# Patient Record
Sex: MALE | Race: WHITE | NOT HISPANIC OR LATINO | Employment: UNEMPLOYED | URBAN - NONMETROPOLITAN AREA
[De-identification: names, ages, dates, MRNs, and addresses within clinical notes are randomized per-mention and may not be internally consistent; named-entity substitution may affect disease eponyms.]

---
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
## 2021-02-23 ENCOUNTER — APPOINTMENT (OUTPATIENT)
Dept: ULTRASOUND IMAGING | Facility: HOSPITAL | Age: 21
End: 2021-02-23
Attending: NURSE PRACTITIONER
Payer: COMMERCIAL

## 2021-02-23 ENCOUNTER — HOSPITAL ENCOUNTER (EMERGENCY)
Facility: HOSPITAL | Age: 21
Discharge: HOME | End: 2021-02-23
Attending: NURSE PRACTITIONER | Admitting: NURSE PRACTITIONER
Payer: COMMERCIAL

## 2021-02-23 VITALS
RESPIRATION RATE: 14 BRPM | DIASTOLIC BLOOD PRESSURE: 87 MMHG | SYSTOLIC BLOOD PRESSURE: 138 MMHG | TEMPERATURE: 98.3 F | OXYGEN SATURATION: 97 % | HEART RATE: 87 BPM

## 2021-02-23 DIAGNOSIS — I86.1 LEFT VARICOCELE: ICD-10-CM

## 2021-02-23 DIAGNOSIS — N50.9 DISORDER OF MALE GENITAL ORGANS: ICD-10-CM

## 2021-02-23 LAB
ALBUMIN UR-MCNC: NEGATIVE MG/DL
APPEARANCE UR: CLEAR
BACTERIA #/AREA URNS HPF: ABNORMAL /HPF
BILIRUB UR QL STRIP: NEGATIVE
C TRACH DNA SPEC QL NAA+PROBE: NOT DETECTED
COLOR UR AUTO: ABNORMAL
GLUCOSE UR STRIP-MCNC: NEGATIVE MG/DL
HGB UR QL STRIP: NEGATIVE
KETONES UR STRIP-MCNC: NEGATIVE MG/DL
LEUKOCYTE ESTERASE UR QL STRIP: NEGATIVE
MUCOUS THREADS #/AREA URNS LPF: PRESENT /LPF
N GONORRHOEA DNA SPEC QL NAA+PROBE: NOT DETECTED
NITRATE UR QL: NEGATIVE
PH UR STRIP: 8 PH (ref 4.7–8)
RBC #/AREA URNS AUTO: 0 /HPF (ref 0–2)
SOURCE: ABNORMAL
SP GR UR STRIP: 1.02 (ref 1–1.03)
SPECIMEN SOURCE: NORMAL
SQUAMOUS #/AREA URNS AUTO: 0 /HPF (ref 0–1)
UROBILINOGEN UR STRIP-MCNC: NORMAL MG/DL (ref 0–2)
WBC #/AREA URNS AUTO: <1 /HPF (ref 0–5)

## 2021-02-23 PROCEDURE — G0463 HOSPITAL OUTPT CLINIC VISIT: HCPCS | Mod: 25

## 2021-02-23 PROCEDURE — 87591 N.GONORRHOEAE DNA AMP PROB: CPT | Performed by: NURSE PRACTITIONER

## 2021-02-23 PROCEDURE — 81001 URINALYSIS AUTO W/SCOPE: CPT | Performed by: NURSE PRACTITIONER

## 2021-02-23 PROCEDURE — 76870 US EXAM SCROTUM: CPT

## 2021-02-23 PROCEDURE — 87491 CHLMYD TRACH DNA AMP PROBE: CPT | Performed by: NURSE PRACTITIONER

## 2021-02-23 PROCEDURE — 99214 OFFICE O/P EST MOD 30 MIN: CPT | Performed by: NURSE PRACTITIONER

## 2021-02-23 ASSESSMENT — ENCOUNTER SYMPTOMS
DYSURIA: 0
VOMITING: 0
DIZZINESS: 0
NAUSEA: 0
HEADACHES: 0
DIARRHEA: 0
ACTIVITY CHANGE: 1
FEVER: 0
LIGHT-HEADEDNESS: 0
BACK PAIN: 0
SHORTNESS OF BREATH: 0
APPETITE CHANGE: 0
FREQUENCY: 1
FLANK PAIN: 0
CHILLS: 0

## 2021-02-23 NOTE — ED PROVIDER NOTES
History     Chief Complaint   Patient presents with     Exposure to STD     requesting std testing due to concerned about possible exposure     HPI  Osvaldo Hadley is a 20 year old male who had  unprotected sex three weeks ago and is now concerned about contracted STI's. His symptoms, however, have been ongoing before this encounter. It is painful for him to urinate after he ejaculates. After he has completed micturition, it still feels like he has to void. This has been ongoing for some time. A warm shower eventually helps the discomfort to subside. smokes cannabis. Denies genital sores, scrotal swelling, testicular pain, and abnormal sperm color.  Denies fevers, chills, nausea, vomiting, diarrhea, and shortness of breath.    Genitourinary symptoms      Duration: three weeks    Description:  Ejaculate and then voids and then has pain. Feels like he still has to void after completion    Intensity:  Mild to moderate    Accompanying signs and symptoms (fever/discharge/nausea/vomiting/back or abdominal pain):  None    History (frequent UTI's/kidney stones/prostate problems): None  Sexually active: YES    Precipitating or alleviating factors: unprotected sex    Therapies tried and outcome: hot shower helps   Outcome: pain subsides.     Allergies:  Allergies   Allergen Reactions     Cats        Problem List:    Patient Active Problem List    Diagnosis Date Noted     NO ACTIVE PROBLEMS 02/21/2018     Priority: Medium        Past Medical History:    Past Medical History:   Diagnosis Date     Contact dermatitis and other eczema, due to unspecified cause 12/06/2011       Past Surgical History:    History reviewed. No pertinent surgical history.    Family History:    Family History   Problem Relation Age of Onset     Breast Cancer Mother      Diabetes Maternal Grandmother      Hypertension Maternal Grandmother      Diabetes Father        Social History:  Marital Status:  Single [1]  Social History     Tobacco Use      Smoking status: Never Smoker     Smokeless tobacco: Never Used     Tobacco comment: yes passive exposure   Substance Use Topics     Alcohol use: None     Drug use: None        Medications:    No current outpatient medications on file.        Review of Systems   Constitutional: Positive for activity change. Negative for appetite change, chills and fever.   Respiratory: Negative for shortness of breath.    Gastrointestinal: Negative for diarrhea, nausea and vomiting.   Genitourinary: Positive for difficulty urinating, frequency and penile pain (after ejaculation and then voiding., but this is not new). Negative for discharge, dysuria, flank pain, genital sores, penile swelling, scrotal swelling and testicular pain.   Musculoskeletal: Negative for back pain.   Skin: Negative.    Neurological: Negative for dizziness, light-headedness and headaches.       Physical Exam   BP: 138/87  Pulse: 87  Temp: 98.3  F (36.8  C)  Resp: 14  SpO2: 97 %      Physical Exam  Vitals signs and nursing note reviewed.   Constitutional:       General: He is not in acute distress.     Appearance: He is normal weight.   Cardiovascular:      Rate and Rhythm: Normal rate and regular rhythm.      Heart sounds: Normal heart sounds. No murmur.   Pulmonary:      Effort: Pulmonary effort is normal. No respiratory distress.      Breath sounds: Normal breath sounds. No wheezing.   Abdominal:      General: Abdomen is flat. Bowel sounds are normal. There is no distension.      Palpations: Abdomen is soft.      Tenderness: There is abdominal tenderness in the suprapubic area. There is no right CVA tenderness, left CVA tenderness or guarding.   Genitourinary:     Comments: Deferred. US completed.  Skin:     General: Skin is warm and dry.   Neurological:      Mental Status: He is alert and oriented to person, place, and time.   Psychiatric:         Behavior: Behavior normal.         ED Course        Procedures           Results for orders placed or performed  during the hospital encounter of 02/23/21 (from the past 24 hour(s))   GC/Chlamydia by PCR - [facility],    Specimen: Urine   Result Value Ref Range    Specimen Source Urine     Neisseria gonorrhoreae PCR Not Detected NDET^Not Detected    Chlamydia Trachomatis PCR Not Detected NDET^Not Detected   UA with Microscopic reflex to Culture    Specimen: Midstream Urine   Result Value Ref Range    Color Urine Light Yellow     Appearance Urine Clear     Glucose Urine Negative NEG^Negative mg/dL    Bilirubin Urine Negative NEG^Negative    Ketones Urine Negative NEG^Negative mg/dL    Specific Gravity Urine 1.018 1.003 - 1.035    Blood Urine Negative NEG^Negative    pH Urine 8.0 4.7 - 8.0 pH    Protein Albumin Urine Negative NEG^Negative mg/dL    Urobilinogen mg/dL Normal 0.0 - 2.0 mg/dL    Nitrite Urine Negative NEG^Negative    Leukocyte Esterase Urine Negative NEG^Negative    Source Midstream Urine     WBC Urine <1 0 - 5 /HPF    RBC Urine 0 0 - 2 /HPF    Bacteria Urine None (A) NEG^Negative /HPF    Squamous Epithelial /HPF Urine 0 0 - 1 /HPF    Mucous Urine Present (A) NEG^Negative /LPF   US Testicular & Scrotum w Doppler Ltd    Narrative    PROCEDURE: US TESTICULAR AND SCROTUM WITH DOPPLER LIMITED    HISTORY: painful urination after ejaculation, chronic    TECHNIQUE:  A scrotal ultrasound was performed.    COMPARISON:  None.    FINDINGS:     MEASUREMENTS:   Right testis: 4.4 x 1.9 x 3.0 (Length x AP x Width)  Left testis: 3.9 x 1.9 x 2.4 (Length x AP x Width)    TESTES:  The testes are normal in size and echogenicity.  No  intratesticular mass is seen. Symmetric arterial flow is present in  both testes on color flow and spectral Doppler evaluation.    EPIDIDYMIDES:  The epididymides are not enlarged.     OTHER: There is a borderline left varicocele with pampiniform veins  measuring up to 3.3 mm. There is a 4 mm shadowing scrotal hilaria in the  left scrotum.      Impression    IMPRESSION:     Borderline dilated left pampiniform  veins may reflect a mild  varicocele.    LETICIA HERNANDEZ MD       Medications - No data to display    Assessments & Plan (with Medical Decision Making)     I have reviewed the nursing notes.    I have reviewed the findings, diagnosis, plan and need for follow up with the patient.  (I86.1) Left varicocele  Comment: 20 year old male who had  unprotected sex three weeks ago and is now concerned about contracted STI's. His symptoms, however, have been ongoing before this encounter. It is painful for him to urinate after he ejaculates. After he has completed micturition, it still feels like he has to void. This has been ongoing for some time. A warm shower eventually helps the discomfort to subside. smokes cannabis. Denies genital sores, scrotal swelling, testicular pain, and abnormal sperm color.  Denies fevers, chills, nausea, vomiting, diarrhea, and shortness of breath.    MDM:NHT. Lungs CTA  Tenderness with palpation over suprapubic region, otherwise normal abdominal assessment. Negative CVA.     G/C screening negative  UA negative    Testicular and scrotal US per radiology:  OTHER: There is a borderline left varicocele with pampiniform veins measuring up to 3.3 mm. There is a 4 mm shadowing scrotal hilaria in the left scrotum.   IMPRESSION: Borderline dilated left pampiniform veins may reflect a mild varicocele.     Plan: education provided for varicocele  Please wear protection for future sexual encounters.   UROLOGY ADULT REFERRAL placed      These discharge instructions and medications were reviewed with him and understanding verbalized.    There are no discharge medications for this patient.      Final diagnoses:   Left varicocele       2/23/2021   [facility]       Hermelinda Graham, CNP  02/24/21 [id number]

## 2021-02-23 NOTE — ED TRIAGE NOTES
"Pt is here with concerned for an STD   Reports he had a \"1 night stand\" and wants to be checked to make sure was not wearing protection  Pt reports he his symptoms are discomfort when ejaculation but this has been ongoing for years before he was recently sexually active   No c/o of discharge or penile pain   Pt gave urine   "

## 2021-02-24 ASSESSMENT — ENCOUNTER SYMPTOMS: DIFFICULTY URINATING: 1

## 2021-03-26 DIAGNOSIS — I86.1 VARICOCELE: Primary | ICD-10-CM

## 2021-03-30 ENCOUNTER — OFFICE VISIT (OUTPATIENT)
Dept: UROLOGY | Facility: OTHER | Age: 21
End: 2021-03-30
Attending: UROLOGY
Payer: COMMERCIAL

## 2021-03-30 VITALS
DIASTOLIC BLOOD PRESSURE: 68 MMHG | WEIGHT: 180 LBS | SYSTOLIC BLOOD PRESSURE: 126 MMHG | OXYGEN SATURATION: 98 % | TEMPERATURE: 97.6 F | BODY MASS INDEX: 24.41 KG/M2 | HEART RATE: 61 BPM

## 2021-03-30 DIAGNOSIS — I86.1 LEFT VARICOCELE: ICD-10-CM

## 2021-03-30 DIAGNOSIS — I86.1 VARICOCELE: ICD-10-CM

## 2021-03-30 LAB
ALBUMIN UR-MCNC: NEGATIVE MG/DL
APPEARANCE UR: CLEAR
BILIRUB UR QL STRIP: NEGATIVE
COLOR UR AUTO: NORMAL
GLUCOSE UR STRIP-MCNC: NEGATIVE MG/DL
HGB UR QL STRIP: NEGATIVE
KETONES UR STRIP-MCNC: NEGATIVE MG/DL
LEUKOCYTE ESTERASE UR QL STRIP: NEGATIVE
NITRATE UR QL: NEGATIVE
PH UR STRIP: 6.5 PH (ref 4.7–8)
SOURCE: NORMAL
SP GR UR STRIP: 1.02 (ref 1–1.03)
UROBILINOGEN UR STRIP-MCNC: NORMAL MG/DL (ref 0–2)

## 2021-03-30 PROCEDURE — 99203 OFFICE O/P NEW LOW 30 MIN: CPT | Performed by: UROLOGY

## 2021-03-30 PROCEDURE — 81003 URINALYSIS AUTO W/O SCOPE: CPT | Mod: ZL | Performed by: UROLOGY

## 2021-03-30 PROCEDURE — G0463 HOSPITAL OUTPT CLINIC VISIT: HCPCS | Mod: 25

## 2021-03-30 PROCEDURE — G0463 HOSPITAL OUTPT CLINIC VISIT: HCPCS

## 2021-03-30 ASSESSMENT — ENCOUNTER SYMPTOMS
BACK PAIN: 1
UNEXPECTED WEIGHT CHANGE: 1
DIARRHEA: 1
DIZZINESS: 1

## 2021-03-30 ASSESSMENT — PAIN SCALES - GENERAL: PAINLEVEL: MILD PAIN (2)

## 2021-03-30 NOTE — NURSING NOTE
Chief Complaint   Patient presents with     left varicocele       Initial /68   Pulse 61   Temp 97.6  F (36.4  C) (Tympanic)   Wt 81.6 kg (180 lb)   SpO2 98%   BMI 24.41 kg/m   Estimated body mass index is 24.41 kg/m  as calculated from the following:    Height as of 3/26/20: 1.829 m (6').    Weight as of this encounter: 81.6 kg (180 lb).  Medication Reconciliation: complete  Abbie Groves LPN

## 2021-03-30 NOTE — PROGRESS NOTES
History     Chief Complaint:    left varicocele      HPI   Osvaldo Hadley is a 20 year old male who presents with a new finding of a varicocele noted on ultrasound.  Osvaldo recently was in the emergency room the end of February and was concerned about STDs because he was having some pain after ejaculation with urination.  His urinalysis at that time did not show any infection or blood.  GC and Chlamydia were negative.  A testicular ultrasound was done and showed a borderline varicocele on the left.  The patient tells me he feels he is noting some more achiness in the left testicle since he was aware that he had a varicocele.  I think his biggest concern is that he has quite a bit of post ejaculate pain.  If he voids before he has an orgasm he does not really have a significant problem but if he or has an orgasm and then voids he has a lot of pain in the whole pelvic floor area.    Allergies:    Allergies   Allergen Reactions     Cats         Medications:      No current outpatient medications on file.      Problem List:      Patient Active Problem List    Diagnosis Date Noted     NO ACTIVE PROBLEMS 02/21/2018     Priority: Medium        Past Medical History:      Past Medical History:   Diagnosis Date     Contact dermatitis and other eczema, due to unspecified cause 12/06/2011       Past Surgical History:      History reviewed. No pertinent surgical history.    Family History:      Family History   Problem Relation Age of Onset     Breast Cancer Mother      Diabetes Maternal Grandmother      Hypertension Maternal Grandmother      Diabetes Father        Social History:    Marital Status:  Single [1]  Social History     Tobacco Use     Smoking status: Never Smoker     Smokeless tobacco: Never Used     Tobacco comment: yes passive exposure   Substance Use Topics     Alcohol use: None     Drug use: None        Review of Systems   Constitutional: Positive for unexpected weight change.   Gastrointestinal: Positive for  diarrhea.   Musculoskeletal: Positive for back pain.   Neurological: Positive for dizziness.   All other systems reviewed and are negative.        Physical Exam   Vitals:  /68   Pulse 61   Temp 97.6  F (36.4  C) (Tympanic)   Wt 81.6 kg (180 lb)   SpO2 98%   BMI 24.41 kg/m        Physical Exam  Constitutional:       Appearance: Normal appearance. He is normal weight.   Pulmonary:      Effort: Pulmonary effort is normal.   Abdominal:      General: Abdomen is flat. There is no distension.      Palpations: Abdomen is soft. There is no mass.      Tenderness: There is no abdominal tenderness.      Hernia: No hernia is present.   Genitourinary:     Comments: Has a circumcised meatus glans shaft the penis is normal.  In a standing position I cannot appreciate a clinical varicocele.  The right testicle is smooth without any masses supratesticular masses or inguinal hernias.  The left testicle is smooth without any masses there does not appear to be any significant thickening of the spermatic cord on that left side very subtle if any change from the right.  There is no inguinal hernia on that left side.  Neurological:      Mental Status: He is alert.     PROCEDURE: US TESTICULAR AND SCROTUM WITH DOPPLER LIMITED     HISTORY: painful urination after ejaculation, chronic     TECHNIQUE:  A scrotal ultrasound was performed.     COMPARISON:  None.     FINDINGS:      MEASUREMENTS:   Right testis: 4.4 x 1.9 x 3.0 (Length x AP x Width)  Left testis: 3.9 x 1.9 x 2.4 (Length x AP x Width)     TESTES:  The testes are normal in size and echogenicity.  No  intratesticular mass is seen. Symmetric arterial flow is present in  both testes on color flow and spectral Doppler evaluation.     EPIDIDYMIDES:  The epididymides are not enlarged.      OTHER: There is a borderline left varicocele with pampiniform veins  measuring up to 3.3 mm. There is a 4 mm shadowing scrotal hilaria in the  left scrotum.                                                                       IMPRESSION:      Borderline dilated left pampiniform veins may reflect a mild  varicocele.       Impression: Pelvic pain, borderline left varicocele  Plan   Plan: I have told Osvaldo that this varicocele is not clinically significant.  Typically the only time to repair a varicocele is if there is significant aching in the testicle which we feel could be related to a clinically significant varicocele or if there are infertility issues.  Patient is interested in checking for fertility so we will go ahead and get that done today.  I cannot explain the cause of his pelvic pain after ejaculation.  He does not have the pain if he voids prior to ejaculation so this is what I would recommend.  Urinalysis a month ago was negative STD testing for GC and Chlamydia were negative.  There could be some inflammation within the ejaculate which could cause symptoms and the semen analysis may help us with that.  We will do a follow-up in about 3 weeks once I get the results back.      No follow-ups on file.    Kenisha Salazar MD  [facility]

## 2021-03-30 NOTE — LETTER
3/30/2021       RE: Osvaldo Hadley  [address]  [facility] [facility] [id number]     Dear Colleague,    Thank you for referring your patient, Osvaldo Hadley, to the [facility]. Please see a copy of my visit note below.      History     Chief Complaint:    left varicocele      HPI   Osvaldo Hadley is a 20 year old male who presents with a new finding of a varicocele noted on ultrasound.  Osvaldo roman was in the emergency room the end of February and was concerned about STDs because he was having some pain after ejaculation with urination.  His urinalysis at that time did not show any infection or blood.  GC and Chlamydia were negative.  A testicular ultrasound was done and showed a borderline varicocele on the left.  The patient tells me he feels he is noting some more achiness in the left testicle since he was aware that he had a varicocele.  I think his biggest concern is that he has quite a bit of post ejaculate pain.  If he voids before he has an orgasm he does not really have a significant problem but if he or has an orgasm and then voids he has a lot of pain in the whole pelvic floor area.    Allergies:    Allergies   Allergen Reactions     Cats         Medications:      No current outpatient medications on file.      Problem List:      Patient Active Problem List    Diagnosis Date Noted     NO ACTIVE PROBLEMS 02/21/2018     Priority: Medium        Past Medical History:      Past Medical History:   Diagnosis Date     Contact dermatitis and other eczema, due to unspecified cause 12/06/2011       Past Surgical History:      History reviewed. No pertinent surgical history.    Family History:      Family History   Problem Relation Age of Onset     Breast Cancer Mother      Diabetes Maternal Grandmother      Hypertension Maternal Grandmother      Diabetes Father        Social History:    Marital Status:  Single [1]  Social  History     Tobacco Use     Smoking status: Never Smoker     Smokeless tobacco: Never Used     Tobacco comment: yes passive exposure   Substance Use Topics     Alcohol use: None     Drug use: None        Review of Systems   Constitutional: Positive for unexpected weight change.   Gastrointestinal: Positive for diarrhea.   Musculoskeletal: Positive for back pain.   Neurological: Positive for dizziness.   All other systems reviewed and are negative.        Physical Exam   Vitals:  /68   Pulse 61   Temp 97.6  F (36.4  C) (Tympanic)   Wt 81.6 kg (180 lb)   SpO2 98%   BMI 24.41 kg/m        Physical Exam  Constitutional:       Appearance: Normal appearance. He is normal weight.   Pulmonary:      Effort: Pulmonary effort is normal.   Abdominal:      General: Abdomen is flat. There is no distension.      Palpations: Abdomen is soft. There is no mass.      Tenderness: There is no abdominal tenderness.      Hernia: No hernia is present.   Genitourinary:     Comments: Has a circumcised meatus glans shaft the penis is normal.  In a standing position I cannot appreciate a clinical varicocele.  The right testicle is smooth without any masses supratesticular masses or inguinal hernias.  The left testicle is smooth without any masses there does not appear to be any significant thickening of the spermatic cord on that left side very subtle if any change from the right.  There is no inguinal hernia on that left side.  Neurological:      Mental Status: He is alert.     PROCEDURE: US TESTICULAR AND SCROTUM WITH DOPPLER LIMITED     HISTORY: painful urination after ejaculation, chronic     TECHNIQUE:  A scrotal ultrasound was performed.     COMPARISON:  None.     FINDINGS:      MEASUREMENTS:   Right testis: 4.4 x 1.9 x 3.0 (Length x AP x Width)  Left testis: 3.9 x 1.9 x 2.4 (Length x AP x Width)     TESTES:  The testes are normal in size and echogenicity.  No  intratesticular mass is seen. Symmetric arterial flow is present  in  both testes on color flow and spectral Doppler evaluation.     EPIDIDYMIDES:  The epididymides are not enlarged.      OTHER: There is a borderline left varicocele with pampiniform veins  measuring up to 3.3 mm. There is a 4 mm shadowing scrotal hilaria in the  left scrotum.                                                                      IMPRESSION:      Borderline dilated left pampiniform veins may reflect a mild  varicocele.       Impression: Pelvic pain, borderline left varicocele  Plan   Plan: I have told Osvaldo that this varicocele is not clinically significant.  Typically the only time to repair a varicocele is if there is significant aching in the testicle which we feel could be related to a clinically significant varicocele or if there are infertility issues.  Patient is interested in checking for fertility so we will go ahead and get that done today.  I cannot explain the cause of his pelvic pain after ejaculation.  He does not have the pain if he voids prior to ejaculation so this is what I would recommend.  Urinalysis a month ago was negative STD testing for GC and Chlamydia were negative.  There could be some inflammation within the ejaculate which could cause symptoms and the semen analysis may help us with that.  We will do a follow-up in about 3 weeks once I get the results back.      No follow-ups on file.    Kenisha Salazar MD  [facility] [place]              Again, thank you for allowing me to participate in the care of your patient.      Sincerely,    Kenisha Salazar MD

## 2021-04-05 DIAGNOSIS — I86.1 LEFT VARICOCELE: ICD-10-CM

## 2021-04-05 LAB — MISCELLANEOUS TEST: NORMAL

## 2021-04-05 PROCEDURE — 89322 SEMEN ANAL STRICT CRITERIA: CPT | Mod: ZL | Performed by: UROLOGY

## 2021-04-05 PROCEDURE — 84999 UNLISTED CHEMISTRY PROCEDURE: CPT | Mod: ZL | Performed by: UROLOGY

## 2021-04-06 LAB
RESULT: NORMAL
SEND OUTS MISC TEST CODE: NORMAL
SEND OUTS MISC TEST SPECIMEN: NORMAL
TEST NAME: NORMAL

## 2021-04-28 ENCOUNTER — VIRTUAL VISIT (OUTPATIENT)
Dept: UROLOGY | Facility: OTHER | Age: 21
End: 2021-04-28
Attending: UROLOGY
Payer: COMMERCIAL

## 2021-04-28 DIAGNOSIS — R10.2 PELVIC PAIN IN MALE: Primary | ICD-10-CM

## 2021-04-28 PROCEDURE — 99212 OFFICE O/P EST SF 10 MIN: CPT | Mod: 95 | Performed by: UROLOGY

## 2021-04-28 ASSESSMENT — PAIN SCALES - GENERAL: PAINLEVEL: NO PAIN (0)

## 2021-04-28 NOTE — NURSING NOTE
Chief Complaint   Patient presents with     Follow Up       Initial There were no vitals taken for this visit. Estimated body mass index is 24.41 kg/m  as calculated from the following:    Height as of 3/26/20: 1.829 m (6').    Weight as of 3/30/21: 81.6 kg (180 lb).  Medication Reconciliation: complete   Review of Systems:    Weight loss:    No     Recent fever/chills:  No   Night sweats:   No  Current skin rash:  No   Recent hair loss:  No  Heat intolerance:  No   Cold intolerance:  No  Chest pain:   No   Palpitations:   No  Shortness of breath:  No   Wheezing:   No  Constipation:    No   Diarrhea:   No   Nausea:   No   Vomiting:   No   Kidney/side pain:  No   Back pain:   No  Frequent headaches:  No   Dizziness:     No  Leg swelling:   No   Calf pain:    No        Nancy Justin LPN

## 2021-04-28 NOTE — PROGRESS NOTES
History     Chief Complaint:      Follow Up      HPI   Osvaldo Hadley is a 20 year old male whom I visited with this morning regarding a follow-up of his left varicocele and ejaculatory pain.  Osvaldo had a subclinical varicocele and we did discuss the indications for repairing a varicocele versus not repairing it.  He did want to pursue evaluation for fertility so we did a semen analysis which came back looking quite good.  He also was having some ejaculatory pain but that is better.  He goes to the bathroom before he has intercourse and orgasm and that has helped significantly.  He is not seeing any blood in his ejaculate.  Overall he is doing very well and he is not having any pain in his testicles.    Allergies:    Allergies   Allergen Reactions     Cats         Medications:      No current outpatient medications on file.      Problem List:      Patient Active Problem List    Diagnosis Date Noted     NO ACTIVE PROBLEMS 02/21/2018     Priority: Medium        Past Medical History:      Past Medical History:   Diagnosis Date     Contact dermatitis and other eczema, due to unspecified cause 12/06/2011       Past Surgical History:      History reviewed. No pertinent surgical history.    Family History:      Family History   Problem Relation Age of Onset     Breast Cancer Mother      Diabetes Maternal Grandmother      Hypertension Maternal Grandmother      Diabetes Father        Social History:    Marital Status:  Single [1]  Social History     Tobacco Use     Smoking status: Never Smoker     Smokeless tobacco: Never Used     Tobacco comment: yes passive exposure   Substance Use Topics     Alcohol use: None     Drug use: None        Review of Systems      Physical Exam   Vitals:  There were no vitals taken for this visit.      Physical Exam  Comment: (Note)   Test                           Result       Flag  Unit    RefValue   ------------------------------------------------------------------   Semen Analysis with  Strict Morphology    Abstinence                   8.0                d                    Two to seven days of abstinence recommended for fertility      testing.  Patient reported that his days of abstinence did      not fall with in the recommended range at testing and thus,      test results may not accurately reflect patient's fertility      status.    Collection Site              Referring Institution    Study Type                   Semen                                  Container Type               50 mL Conical    Appearance                   Normal                                Semen Volume                 4.0                mL      >=1.5      pH                           8.0                        >=7.2      Motile/mL                    37.6               x10(6)  >=6.0      Sperm/mL                     64.9               x10(6)  >=15.0      Motility                     58                 %       >=40        Grade                        3.5                        >=2.5      Motile/Ejaculate             150.4              x10(6)  >=9.0      Viscosity                    4.0                        >=3.0      Agglutination                4.0                        >=3.0      Comment                      SEE NOTE                               Impression:pelvic Pain, small left varicocele    Plan   Plan: I reassured Osvaldo that the semen analysis looks quite good and I would not be too concerned about that at this time.  The varicocele is asymptomatic so no surgical intervention is necessary.  His pelvic pain has improved and he is managing that quite well.  Follow-up is as needed 5 minutes in direct communication with the patient and 6 minutes and pre and post charting.      No follow-ups on file.    Kenisha Salazar MD  [facility]

## 2021-08-11 ENCOUNTER — HOSPITAL ENCOUNTER (EMERGENCY)
Facility: HOSPITAL | Age: 21
Discharge: HOME | End: 2021-08-11
Attending: NURSE PRACTITIONER | Admitting: NURSE PRACTITIONER
Payer: COMMERCIAL

## 2021-08-11 VITALS
RESPIRATION RATE: 18 BRPM | OXYGEN SATURATION: 98 % | SYSTOLIC BLOOD PRESSURE: 168 MMHG | HEART RATE: 112 BPM | DIASTOLIC BLOOD PRESSURE: 102 MMHG | TEMPERATURE: 99 F

## 2021-08-11 DIAGNOSIS — T28.911A: ICD-10-CM

## 2021-08-11 PROCEDURE — 99213 OFFICE O/P EST LOW 20 MIN: CPT | Performed by: NURSE PRACTITIONER

## 2021-08-11 PROCEDURE — G0463 HOSPITAL OUTPT CLINIC VISIT: HCPCS

## 2021-08-11 RX ORDER — OFLOXACIN 3 MG/ML
5 SOLUTION AURICULAR (OTIC) 2 TIMES DAILY
Qty: 4 ML | Refills: 0 | Status: SHIPPED | OUTPATIENT
Start: 2021-08-11 | End: 2021-08-18

## 2021-08-11 RX ORDER — CLONIDINE HYDROCHLORIDE 0.1 MG/1
0.1 TABLET ORAL 2 TIMES DAILY PRN
Status: ON HOLD | COMMUNITY
End: 2023-03-20

## 2021-08-11 ASSESSMENT — ENCOUNTER SYMPTOMS
SORE THROAT: 0
RHINORRHEA: 0
SINUS PRESSURE: 0
ACTIVITY CHANGE: 1
HEADACHES: 0
DIZZINESS: 0
DIARRHEA: 0
CHILLS: 0
VOMITING: 0
EYES NEGATIVE: 1
LIGHT-HEADEDNESS: 1
NAUSEA: 1
SINUS PAIN: 0
SHORTNESS OF BREATH: 0

## 2021-08-11 NOTE — DISCHARGE INSTRUCTIONS
Ibuprofen 600 to 800 mg (3 - 4 tabs of over the counter med) every six to eight hours as needed;not to exceed maximum amount of 3200 mg in 24 hours. Take with food. Tylenol 650 to 1000 mg every four to six hours as needed (not to exceed more than 4000 mg in a 24 hour period). May use interchangeably. Suggest medicating around the clock for the next 24-48 hours.  Follow up with primary provider  as needed    Wash hands before and after instilling ear drops into infected ear. Warm ear drops between hands for 1-2 minutes before applying. After medication instilled pump on the tragus 3-4 times to ensure medication is reaching ear. Should remain quiet with infected ear upwards for five minutes after instillation of ear drops.
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
## 2021-08-14 ENCOUNTER — TRANSFERRED RECORDS (OUTPATIENT)
Dept: HEALTH INFORMATION MANAGEMENT | Facility: CLINIC | Age: 21
End: 2021-08-14

## 2021-08-18 ASSESSMENT — ENCOUNTER SYMPTOMS: FEVER: 1

## 2021-08-18 NOTE — PROGRESS NOTES
SUBJECTIVE:   CC: Osvaldo Hadley is an 21 year old male who presents for preventative health visit.       Patient has been advised of split billing requirements and indicates understanding: Yes       Healthy Habits:     Getting at least 3 servings of Calcium per day:  NO    Bi-annual eye exam:  Yes    Dental care twice a year:  Yes    Sleep apnea or symptoms of sleep apnea:  None    Diet:  Regular (no restrictions)    Frequency of exercise:  6-7 days/week    Duration of exercise:  45-60 minutes    Taking medications regularly:  Yes    Medication side effects:  None    PHQ-2 Total Score: 0    Additional concerns today:  No      Currently follows [facility] (counseling) with Mady Vance. Is trying to get into [facility] (hoping to establish for medication management)  DA completed by Mady - for adjustment Disorder with mixed anxiety and depressed mood,   He was seen at [facility] for further testing with new diagnosis of (reviwed [facility] psychological evaluation ) copied from note 8/14/21  -other specified trauma and stressor related disorder rule/out PTSD and personality disorder  - autism spectrum disorder rule/out ANDRIA  -attention deficit hyperactive disorder combined presentation rule/out possible aydin or other mood disorder including major depressive disorder  -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  So would only take at night.    He is having anxiety due to travel coming up.  He has a severe anxiety and panic with flying.  He is terrified of flying.  Psychiatry at [facility] made him feel worse.          Today's PHQ-2 Score:   PHQ-2 ( 1999 Pfizer) 8/25/2021   Q1: Little interest or pleasure in doing things 0   Q2: Feeling down, depressed or hopeless 0   PHQ-2 Score 0   Q1: Little interest or pleasure in  doing things Not at all   Q2: Feeling down, depressed or hopeless Not at all   PHQ-2 Score 0       Abuse: Current or Past(Physical, Sexual or Emotional)- No  Do you feel safe in your environment? Yes    Have you ever done Advance Care Planning? (For example, a Health Directive, POLST, or a discussion with a medical provider or your loved ones about your wishes): No, advance care planning information given to patient to review.  Patient plans to discuss their wishes with loved ones or provider.      Social History     Tobacco Use     Smoking status: Never Smoker     Smokeless tobacco: Never Used     Tobacco comment: yes passive exposure   Substance Use Topics     Alcohol use: Not on file     If you drink alcohol do you typically have >3 drinks per day or >7 drinks per week? No    Alcohol Use 8/25/2021   Prescreen: >3 drinks/day or >7 drinks/week? Not Applicable   Prescreen: >3 drinks/day or >7 drinks/week? -       Last PSA: No results found for: PSA    Reviewed orders with patient. Reviewed health maintenance and updated orders accordingly - Yes  Lab work is in process  BP Readings from Last 3 Encounters:   08/25/21 (!) 150/98   08/11/21 (!) 168/102   03/30/21 126/68    Wt Readings from Last 3 Encounters:   08/25/21 75.1 kg (165 lb 9.6 oz)   03/30/21 81.6 kg (180 lb) (81 %, Z= 0.87)*   03/26/20 81.6 kg (180 lb) (84 %, Z= 0.99)*     * Growth percentiles are based on CDC (Boys, 2-20 Years) data.                  Patient Active Problem List   Diagnosis     NO ACTIVE PROBLEMS     History reviewed. No pertinent surgical history.    Social History     Tobacco Use     Smoking status: Never Smoker     Smokeless tobacco: Never Used     Tobacco comment: yes passive exposure   Substance Use Topics     Alcohol use: Not on file     Family History   Problem Relation Age of Onset     Breast Cancer Mother      Diabetes Maternal Grandmother      Hypertension Maternal Grandmother      Diabetes Father          Current Outpatient  Medications   Medication Sig Dispense Refill     cloNIDine (CATAPRES) 0.1 MG tablet Take 0.1 mg by mouth 2 times daily as needed       Allergies   Allergen Reactions     Cats        Reviewed and updated as needed this visit by clinical staff  Tobacco  Allergies  Meds   Med Hx  Surg Hx  Fam Hx  Soc Hx        Reviewed and updated as needed this visit by Provider                    Review of Systems  CONSTITUTIONAL: NEGATIVE for fever, chills, change in weight  INTEGUMENTARY/SKIN: NEGATIVE for worrisome rashes, moles or lesions  EYES: NEGATIVE for vision changes or irritation and wear glasses   ENT: NEGATIVE for ear, mouth and throat problems  RESP: NEGATIVE for significant cough or SOB  CV: NEGATIVE for chest pain, palpitations or peripheral edema  GI: NEGATIVE for nausea, abdominal pain, heartburn, or change in bowel habits   male: negative for dysuria, hematuria, decreased urinary stream, erectile dysfunction, urethral discharge  MUSCULOSKELETAL: NEGATIVE for significant arthralgias or myalgia  NEURO: NEGATIVE for weakness, dizziness or paresthesias  ENDOCRINE: NEGATIVE for temperature intolerance, skin/hair changes  PSYCHIATRIC: HX anxiety and HX depression    OBJECTIVE:   BP (!) 150/98 (BP Location: Right arm, Patient Position: Sitting, Cuff Size: Adult Regular)   Pulse 95   Temp 99  F (37.2  C) (Tympanic)   Resp 18   Wt 75.1 kg (165 lb 9.6 oz)   SpO2 98%   BMI 22.46 kg/m      Physical Exam  GENERAL: healthy, alert and no distress  EYES: Eyes grossly normal to inspection, PERRL and conjunctivae and sclerae normal  HENT: ear canals and TM's normal, nose and mouth without ulcers or lesions  NECK: no adenopathy, no asymmetry, masses, or scars and thyroid normal to palpation  RESP: lungs clear to auscultation - no rales, rhonchi or wheezes  CV: regular rate and rhythm, normal S1 S2, no S3 or S4, no murmur, click or rub, no peripheral edema and peripheral pulses strong  ABDOMEN: soft, nontender, no  hepatosplenomegaly, no masses and bowel sounds normal  MS: no gross musculoskeletal defects noted, no edema  SKIN: no suspicious lesions or rashes  NEURO: Normal strength and tone, sensory exam grossly normal, mentation intact, speech normal and cranial nerves 2-12 intact  PSYCH: mentation appears normal and affect flat  LYMPH: no cervical adenopathy    Diagnostic Test Results:  Labs reviewed in Epic  Results for orders placed or performed in visit on 08/25/21 (from the past 24 hour(s))   TSH with free T4 reflex   Result Value Ref Range    TSH 0.76 0.40 - 4.00 mU/L   CBC with platelets and differential    Narrative    The following orders were created for panel order CBC with platelets and differential.  Procedure                               Abnormality         Status                     ---------                               -----------         ------                     CBC with platelets and d...[[id number]]                      Final result                 Please view results for these tests on the individual orders.   Comprehensive metabolic panel (BMP + Alb, Alk Phos, ALT, AST, Total. Bili, TP)   Result Value Ref Range    Sodium 140 133 - 144 mmol/L    Potassium 3.6 3.4 - 5.3 mmol/L    Chloride 108 94 - 109 mmol/L    Carbon Dioxide (CO2) 26 20 - 32 mmol/L    Anion Gap 6 3 - 14 mmol/L    Urea Nitrogen 6 (L) 7 - 30 mg/dL    Creatinine 0.77 0.66 - 1.25 mg/dL    Calcium 8.7 8.5 - 10.1 mg/dL    Glucose 116 (H) 70 - 99 mg/dL    Alkaline Phosphatase 69 40 - 150 U/L    AST <3 0 - 45 U/L    ALT 19 0 - 70 U/L    Protein Total 7.6 6.8 - 8.8 g/dL    Albumin 4.1 3.4 - 5.0 g/dL    Bilirubin Total 0.3 0.2 - 1.3 mg/dL    GFR Estimate >90 >60 mL/min/1.73m2   CBC with platelets and differential   Result Value Ref Range    WBC Count 5.1 4.0 - 11.0 10e3/uL    RBC Count 5.37 4.40 - 5.90 10e6/uL    Hemoglobin 15.8 13.3 - 17.7 g/dL    Hematocrit 47.3 40.0 - 53.0 %    MCV 88 78 - 100 fL    MCH 29.4 26.5 - 33.0 pg    MCHC 33.4 31.5  - 36.5 g/dL    RDW 12.5 10.0 - 15.0 %    Platelet Count 232 150 - 450 10e3/uL    % Neutrophils 53 %    % Lymphocytes 29 %    % Monocytes 12 %    % Eosinophils 5 %    % Basophils 1 %    % Immature Granulocytes 0 %    NRBCs per 100 WBC 0 <1 /100    Absolute Neutrophils 2.7 1.6 - 8.3 10e3/uL    Absolute Lymphocytes 1.5 0.8 - 5.3 10e3/uL    Absolute Monocytes 0.6 0.0 - 1.3 10e3/uL    Absolute Eosinophils 0.3 0.0 - 0.7 10e3/uL    Absolute Basophils 0.0 0.0 - 0.2 10e3/uL    Absolute Immature Granulocytes 0.0 <=0.0 10e3/uL    Absolute NRBCs 0.0 10e3/uL       ASSESSMENT/PLAN:       ICD-10-CM    1. Need for HPV vaccination  Z23 HPV, IM (9 - 26 YRS) - Gardasil 9   2. Routine general medical examination at a health care facility  Z00.00    3. Adjustment disorder with mixed anxiety and depressed mood  F43.23 Comprehensive metabolic panel (BMP + Alb, Alk Phos, ALT, AST, Total. Bili, TP)     CBC with platelets and differential     TSH with free T4 reflex     TSH with free T4 reflex     CBC with platelets and differential     Comprehensive metabolic panel (BMP + Alb, Alk Phos, ALT, AST, Total. Bili, TP)   4. Attention deficit hyperactivity disorder (ADHD), unspecified ADHD type  F90.9    5. Autism spectrum disorder  F84.0    6. Mood disorder (H)  F39      Updated mental health diagnosis   He will be following with [facility] today to establish psychiatry for medication management.      He does have to fly in the near future - consider medication for flying if not fully established with psychiatry     Patient has been advised of split billing requirements and indicates understanding: No  COUNSELING:   Reviewed preventive health counseling, as reflected in patient instructions       Regular exercise       Healthy diet/nutrition    Estimated body mass index is 22.46 kg/m  as calculated from the following:    Height as of 3/26/20: 1.829 m (6').    Weight as of this encounter: 75.1 kg (165 lb 9.6 oz).         He  reports that he has never smoked. He has never used smokeless tobacco.      Counseling Resources:  ATP IV Guidelines  Pooled Cohorts Equation Calculator  FRAX Risk Assessment  ICSI Preventive Guidelines  Dietary Guidelines for Americans, 2010  USDA's MyPlate  ASA Prophylaxis  Lung CA Screening    PRASHANT Merritt CNP  [facility]

## 2021-08-25 ENCOUNTER — OFFICE VISIT (OUTPATIENT)
Dept: FAMILY MEDICINE | Facility: OTHER | Age: 21
End: 2021-08-25
Attending: NURSE PRACTITIONER
Payer: COMMERCIAL

## 2021-08-25 VITALS
SYSTOLIC BLOOD PRESSURE: 150 MMHG | WEIGHT: 165.6 LBS | TEMPERATURE: 99 F | RESPIRATION RATE: 18 BRPM | OXYGEN SATURATION: 98 % | BODY MASS INDEX: 22.46 KG/M2 | HEART RATE: 95 BPM | DIASTOLIC BLOOD PRESSURE: 98 MMHG

## 2021-08-25 DIAGNOSIS — F39 MOOD DISORDER (H): ICD-10-CM

## 2021-08-25 DIAGNOSIS — F43.23 ADJUSTMENT DISORDER WITH MIXED ANXIETY AND DEPRESSED MOOD: ICD-10-CM

## 2021-08-25 DIAGNOSIS — F84.0 AUTISM SPECTRUM DISORDER: ICD-10-CM

## 2021-08-25 DIAGNOSIS — Z23 NEED FOR HPV VACCINATION: Primary | ICD-10-CM

## 2021-08-25 DIAGNOSIS — Z00.00 ROUTINE GENERAL MEDICAL EXAMINATION AT A HEALTH CARE FACILITY: ICD-10-CM

## 2021-08-25 DIAGNOSIS — F90.9 ATTENTION DEFICIT HYPERACTIVITY DISORDER (ADHD), UNSPECIFIED ADHD TYPE: ICD-10-CM

## 2021-08-25 LAB
ALBUMIN SERPL-MCNC: 4.1 G/DL (ref 3.4–5)
ALP SERPL-CCNC: 69 U/L (ref 40–150)
ALT SERPL W P-5'-P-CCNC: 19 U/L (ref 0–70)
ANION GAP SERPL CALCULATED.3IONS-SCNC: 6 MMOL/L (ref 3–14)
AST SERPL W P-5'-P-CCNC: <3 U/L (ref 0–45)
BASOPHILS # BLD AUTO: 0 10E3/UL (ref 0–0.2)
BASOPHILS NFR BLD AUTO: 1 %
BILIRUB SERPL-MCNC: 0.3 MG/DL (ref 0.2–1.3)
BUN SERPL-MCNC: 6 MG/DL (ref 7–30)
CALCIUM SERPL-MCNC: 8.7 MG/DL (ref 8.5–10.1)
CHLORIDE BLD-SCNC: 108 MMOL/L (ref 94–109)
CO2 SERPL-SCNC: 26 MMOL/L (ref 20–32)
CREAT SERPL-MCNC: 0.77 MG/DL (ref 0.66–1.25)
EOSINOPHIL # BLD AUTO: 0.3 10E3/UL (ref 0–0.7)
EOSINOPHIL NFR BLD AUTO: 5 %
ERYTHROCYTE [DISTWIDTH] IN BLOOD BY AUTOMATED COUNT: 12.5 % (ref 10–15)
GFR SERPL CREATININE-BSD FRML MDRD: >90 ML/MIN/1.73M2
GLUCOSE BLD-MCNC: 116 MG/DL (ref 70–99)
HCT VFR BLD AUTO: 47.3 % (ref 40–53)
HGB BLD-MCNC: 15.8 G/DL (ref 13.3–17.7)
IMM GRANULOCYTES # BLD: 0 10E3/UL
IMM GRANULOCYTES NFR BLD: 0 %
LYMPHOCYTES # BLD AUTO: 1.5 10E3/UL (ref 0.8–5.3)
LYMPHOCYTES NFR BLD AUTO: 29 %
MCH RBC QN AUTO: 29.4 PG (ref 26.5–33)
MCHC RBC AUTO-ENTMCNC: 33.4 G/DL (ref 31.5–36.5)
MCV RBC AUTO: 88 FL (ref 78–100)
MONOCYTES # BLD AUTO: 0.6 10E3/UL (ref 0–1.3)
MONOCYTES NFR BLD AUTO: 12 %
NEUTROPHILS # BLD AUTO: 2.7 10E3/UL (ref 1.6–8.3)
NEUTROPHILS NFR BLD AUTO: 53 %
NRBC # BLD AUTO: 0 10E3/UL
NRBC BLD AUTO-RTO: 0 /100
PLATELET # BLD AUTO: 232 10E3/UL (ref 150–450)
POTASSIUM BLD-SCNC: 3.6 MMOL/L (ref 3.4–5.3)
PROT SERPL-MCNC: 7.6 G/DL (ref 6.8–8.8)
RBC # BLD AUTO: 5.37 10E6/UL (ref 4.4–5.9)
SODIUM SERPL-SCNC: 140 MMOL/L (ref 133–144)
TSH SERPL DL<=0.005 MIU/L-ACNC: 0.76 MU/L (ref 0.4–4)
WBC # BLD AUTO: 5.1 10E3/UL (ref 4–11)

## 2021-08-25 PROCEDURE — G0463 HOSPITAL OUTPT CLINIC VISIT: HCPCS | Mod: 25

## 2021-08-25 PROCEDURE — 36415 COLL VENOUS BLD VENIPUNCTURE: CPT | Mod: ZL | Performed by: NURSE PRACTITIONER

## 2021-08-25 PROCEDURE — 80053 COMPREHEN METABOLIC PANEL: CPT | Mod: ZL | Performed by: NURSE PRACTITIONER

## 2021-08-25 PROCEDURE — 90651 9VHPV VACCINE 2/3 DOSE IM: CPT

## 2021-08-25 PROCEDURE — 99395 PREV VISIT EST AGE 18-39: CPT | Performed by: NURSE PRACTITIONER

## 2021-08-25 PROCEDURE — 85025 COMPLETE CBC W/AUTO DIFF WBC: CPT | Mod: ZL | Performed by: NURSE PRACTITIONER

## 2021-08-25 PROCEDURE — 84443 ASSAY THYROID STIM HORMONE: CPT | Mod: ZL | Performed by: NURSE PRACTITIONER

## 2021-08-25 ASSESSMENT — PAIN SCALES - GENERAL: PAINLEVEL: NO PAIN (0)

## 2021-08-25 ASSESSMENT — ANXIETY QUESTIONNAIRES
3. WORRYING TOO MUCH ABOUT DIFFERENT THINGS: MORE THAN HALF THE DAYS
2. NOT BEING ABLE TO STOP OR CONTROL WORRYING: MORE THAN HALF THE DAYS
5. BEING SO RESTLESS THAT IT IS HARD TO SIT STILL: NEARLY EVERY DAY
1. FEELING NERVOUS, ANXIOUS, OR ON EDGE: NEARLY EVERY DAY
6. BECOMING EASILY ANNOYED OR IRRITABLE: MORE THAN HALF THE DAYS
GAD7 TOTAL SCORE: 13
IF YOU CHECKED OFF ANY PROBLEMS ON THIS QUESTIONNAIRE, HOW DIFFICULT HAVE THESE PROBLEMS MADE IT FOR YOU TO DO YOUR WORK, TAKE CARE OF THINGS AT HOME, OR GET ALONG WITH OTHER PEOPLE: EXTREMELY DIFFICULT
7. FEELING AFRAID AS IF SOMETHING AWFUL MIGHT HAPPEN: NOT AT ALL

## 2021-08-25 ASSESSMENT — PATIENT HEALTH QUESTIONNAIRE - PHQ9
5. POOR APPETITE OR OVEREATING: SEVERAL DAYS
SUM OF ALL RESPONSES TO PHQ QUESTIONS 1-9: 6

## 2021-08-25 NOTE — NURSING NOTE
Chief Complaint   Patient presents with     Physical       Initial BP (!) 150/98 (BP Location: Right arm, Patient Position: Sitting, Cuff Size: Adult Regular)   Pulse 95   Temp 99  F (37.2  C) (Tympanic)   Resp 18   Wt 75.1 kg (165 lb 9.6 oz)   SpO2 98%   BMI 22.46 kg/m   Estimated body mass index is 22.46 kg/m  as calculated from the following:    Height as of 3/26/20: 1.829 m (6').    Weight as of this encounter: 75.1 kg (165 lb 9.6 oz).  Medication Reconciliation: complete  Kimberly De La Torre LPN

## 2021-08-26 ASSESSMENT — ANXIETY QUESTIONNAIRES: GAD7 TOTAL SCORE: 13

## 2021-10-03 ENCOUNTER — HEALTH MAINTENANCE LETTER (OUTPATIENT)
Age: 21
End: 2021-10-03

## 2022-02-01 ENCOUNTER — APPOINTMENT (OUTPATIENT)
Dept: OCCUPATIONAL MEDICINE | Facility: OTHER | Age: 22
End: 2022-02-01

## 2022-02-01 PROCEDURE — 92499 UNLISTED OPH SVC/PROCEDURE: CPT

## 2022-02-01 PROCEDURE — 99499 UNLISTED E&M SERVICE: CPT

## 2022-02-01 PROCEDURE — 99000 SPECIMEN HANDLING OFFICE-LAB: CPT

## 2022-02-01 PROCEDURE — 92552 PURE TONE AUDIOMETRY AIR: CPT

## 2022-09-04 ENCOUNTER — HEALTH MAINTENANCE LETTER (OUTPATIENT)
Age: 22
End: 2022-09-04

## 2023-01-15 ENCOUNTER — HEALTH MAINTENANCE LETTER (OUTPATIENT)
Age: 23
End: 2023-01-15

## 2023-03-19 ENCOUNTER — HOSPITAL ENCOUNTER (INPATIENT)
Facility: HOSPITAL | Age: 23
LOS: 3 days | Discharge: HOME | End: 2023-03-22
Attending: FAMILY MEDICINE | Admitting: STUDENT IN AN ORGANIZED HEALTH CARE EDUCATION/TRAINING PROGRAM
Payer: MEDICAID

## 2023-03-19 ENCOUNTER — TELEPHONE (OUTPATIENT)
Dept: BEHAVIORAL HEALTH | Facility: CLINIC | Age: 23
End: 2023-03-19

## 2023-03-19 DIAGNOSIS — F84.0 AUTISM SPECTRUM DISORDER: ICD-10-CM

## 2023-03-19 DIAGNOSIS — F33.1 MODERATE EPISODE OF RECURRENT MAJOR DEPRESSIVE DISORDER (H): ICD-10-CM

## 2023-03-19 DIAGNOSIS — F90.9 ATTENTION DEFICIT HYPERACTIVITY DISORDER (ADHD), UNSPECIFIED ADHD TYPE: Primary | ICD-10-CM

## 2023-03-19 DIAGNOSIS — R45.851 SUICIDAL IDEATION: ICD-10-CM

## 2023-03-19 LAB
AMPHETAMINES UR QL: NOT DETECTED
BARBITURATES UR QL SCN: NOT DETECTED
BENZODIAZ UR QL SCN: NOT DETECTED
BUPRENORPHINE UR QL: NOT DETECTED
CANNABINOIDS UR QL: DETECTED
COCAINE UR QL SCN: NOT DETECTED
D-METHAMPHET UR QL: NOT DETECTED
METHADONE UR QL SCN: NOT DETECTED
OPIATES UR QL SCN: NOT DETECTED
OXYCODONE UR QL SCN: NOT DETECTED
PCP UR QL SCN: NOT DETECTED
PROPOXYPH UR QL: NOT DETECTED
SARS-COV-2 RNA RESP QL NAA+PROBE: NEGATIVE
TRICYCLICS UR QL SCN: NOT DETECTED

## 2023-03-19 PROCEDURE — 250N000013 HC RX MED GY IP 250 OP 250 PS 637: Performed by: NURSE PRACTITIONER

## 2023-03-19 PROCEDURE — 80306 DRUG TEST PRSMV INSTRMNT: CPT | Performed by: NURSE PRACTITIONER

## 2023-03-19 PROCEDURE — 99285 EMERGENCY DEPT VISIT HI MDM: CPT

## 2023-03-19 PROCEDURE — C9803 HOPD COVID-19 SPEC COLLECT: HCPCS

## 2023-03-19 PROCEDURE — 124N000001 HC R&B MH

## 2023-03-19 PROCEDURE — 99285 EMERGENCY DEPT VISIT HI MDM: CPT | Performed by: NURSE PRACTITIONER

## 2023-03-19 PROCEDURE — U0003 INFECTIOUS AGENT DETECTION BY NUCLEIC ACID (DNA OR RNA); SEVERE ACUTE RESPIRATORY SYNDROME CORONAVIRUS 2 (SARS-COV-2) (CORONAVIRUS DISEASE [COVID-19]), AMPLIFIED PROBE TECHNIQUE, MAKING USE OF HIGH THROUGHPUT TECHNOLOGIES AS DESCRIBED BY CMS-2020-01-R: HCPCS | Performed by: NURSE PRACTITIONER

## 2023-03-19 RX ORDER — CLONIDINE HYDROCHLORIDE 0.1 MG/1
0.1 TABLET ORAL 2 TIMES DAILY PRN
Status: DISCONTINUED | OUTPATIENT
Start: 2023-03-19 | End: 2023-03-22 | Stop reason: HOSPADM

## 2023-03-19 RX ORDER — LISDEXAMFETAMINE DIMESYLATE 30 MG/1
30 CAPSULE ORAL DAILY
Status: DISCONTINUED | OUTPATIENT
Start: 2023-03-20 | End: 2023-03-19

## 2023-03-19 RX ORDER — LANOLIN ALCOHOL/MO/W.PET/CERES
3 CREAM (GRAM) TOPICAL
Status: DISCONTINUED | OUTPATIENT
Start: 2023-03-19 | End: 2023-03-22 | Stop reason: HOSPADM

## 2023-03-19 RX ORDER — OLANZAPINE 10 MG/1
10 TABLET ORAL 3 TIMES DAILY PRN
Status: DISCONTINUED | OUTPATIENT
Start: 2023-03-19 | End: 2023-03-22 | Stop reason: HOSPADM

## 2023-03-19 RX ORDER — LAMOTRIGINE 150 MG/1
1 TABLET ORAL DAILY
Status: ON HOLD | COMMUNITY
Start: 2023-02-13 | End: 2023-03-22

## 2023-03-19 RX ORDER — HYDROXYZINE PAMOATE 25 MG/1
25-50 CAPSULE ORAL 4 TIMES DAILY PRN
Status: ON HOLD | COMMUNITY
Start: 2023-01-13 | End: 2023-03-22

## 2023-03-19 RX ORDER — CLONAZEPAM 1 MG/1
1 TABLET ORAL
Status: DISCONTINUED | OUTPATIENT
Start: 2023-03-19 | End: 2023-03-20

## 2023-03-19 RX ORDER — MAGNESIUM HYDROXIDE/ALUMINUM HYDROXICE/SIMETHICONE 120; 1200; 1200 MG/30ML; MG/30ML; MG/30ML
30 SUSPENSION ORAL EVERY 4 HOURS PRN
Status: DISCONTINUED | OUTPATIENT
Start: 2023-03-19 | End: 2023-03-22 | Stop reason: HOSPADM

## 2023-03-19 RX ORDER — LISDEXAMFETAMINE DIMESYLATE 30 MG/1
30 CAPSULE ORAL EVERY MORNING
Status: ON HOLD | COMMUNITY
Start: 2023-03-06 | End: 2023-03-22

## 2023-03-19 RX ORDER — LORAZEPAM 2 MG/ML
2 INJECTION INTRAMUSCULAR 2 TIMES DAILY PRN
Status: DISCONTINUED | OUTPATIENT
Start: 2023-03-19 | End: 2023-03-20

## 2023-03-19 RX ORDER — CLONAZEPAM 1 MG/1
1 TABLET ORAL
Status: DISCONTINUED | OUTPATIENT
Start: 2023-03-20 | End: 2023-03-19

## 2023-03-19 RX ORDER — OLANZAPINE 10 MG/2ML
10 INJECTION, POWDER, FOR SOLUTION INTRAMUSCULAR 3 TIMES DAILY PRN
Status: DISCONTINUED | OUTPATIENT
Start: 2023-03-19 | End: 2023-03-22 | Stop reason: HOSPADM

## 2023-03-19 RX ORDER — HYDROXYZINE HYDROCHLORIDE 25 MG/1
25-50 TABLET, FILM COATED ORAL 4 TIMES DAILY PRN
Status: DISCONTINUED | OUTPATIENT
Start: 2023-03-19 | End: 2023-03-21

## 2023-03-19 RX ORDER — CLONAZEPAM 1 MG/1
1 TABLET ORAL
Status: ON HOLD | COMMUNITY
Start: 2023-02-10 | End: 2023-03-22

## 2023-03-19 RX ORDER — HYDROXYZINE HYDROCHLORIDE 25 MG/1
25 TABLET, FILM COATED ORAL EVERY 4 HOURS PRN
Status: DISCONTINUED | OUTPATIENT
Start: 2023-03-19 | End: 2023-03-22 | Stop reason: HOSPADM

## 2023-03-19 RX ORDER — LORAZEPAM 1 MG/1
2 TABLET ORAL 2 TIMES DAILY PRN
Status: DISCONTINUED | OUTPATIENT
Start: 2023-03-19 | End: 2023-03-20

## 2023-03-19 RX ORDER — ACETAMINOPHEN 325 MG/1
650 TABLET ORAL EVERY 4 HOURS PRN
Status: DISCONTINUED | OUTPATIENT
Start: 2023-03-19 | End: 2023-03-22 | Stop reason: HOSPADM

## 2023-03-19 RX ORDER — AMOXICILLIN 250 MG
1 CAPSULE ORAL 2 TIMES DAILY PRN
Status: DISCONTINUED | OUTPATIENT
Start: 2023-03-19 | End: 2023-03-22 | Stop reason: HOSPADM

## 2023-03-19 RX ADMIN — CLONAZEPAM 1 MG: 1 TABLET ORAL at 22:11

## 2023-03-19 ASSESSMENT — ACTIVITIES OF DAILY LIVING (ADL)
ADLS_ACUITY_SCORE: 35
ADLS_ACUITY_SCORE: 45
ADLS_ACUITY_SCORE: 35

## 2023-03-19 ASSESSMENT — COLUMBIA-SUICIDE SEVERITY RATING SCALE - C-SSRS
1. HAVE YOU WISHED YOU WERE DEAD OR WISHED YOU COULD GO TO SLEEP AND NOT WAKE UP?: NO
2. HAVE YOU ACTUALLY HAD ANY THOUGHTS OF KILLING YOURSELF?: NO
TOTAL  NUMBER OF ABORTED OR SELF INTERRUPTED ATTEMPTS LIFETIME: NO
TOTAL  NUMBER OF INTERRUPTED ATTEMPTS LIFETIME: NO
6. HAVE YOU EVER DONE ANYTHING, STARTED TO DO ANYTHING, OR PREPARED TO DO ANYTHING TO END YOUR LIFE?: NO
ATTEMPT LIFETIME: NO
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
## 2023-03-20 PROCEDURE — 250N000013 HC RX MED GY IP 250 OP 250 PS 637: Performed by: NURSE PRACTITIONER

## 2023-03-20 PROCEDURE — 99223 1ST HOSP IP/OBS HIGH 75: CPT | Mod: AI | Performed by: NURSE PRACTITIONER

## 2023-03-20 PROCEDURE — 124N000001 HC R&B MH

## 2023-03-20 RX ORDER — ALBUTEROL SULFATE 90 UG/1
2 AEROSOL, METERED RESPIRATORY (INHALATION) EVERY 6 HOURS PRN
Status: DISCONTINUED | OUTPATIENT
Start: 2023-03-20 | End: 2023-03-22 | Stop reason: HOSPADM

## 2023-03-20 RX ORDER — CLONAZEPAM 1 MG/1
1 TABLET ORAL 2 TIMES DAILY PRN
Status: DISCONTINUED | OUTPATIENT
Start: 2023-03-20 | End: 2023-03-22 | Stop reason: HOSPADM

## 2023-03-20 RX ORDER — ALBUTEROL SULFATE 90 UG/1
2 AEROSOL, METERED RESPIRATORY (INHALATION) EVERY 6 HOURS PRN
COMMUNITY
Start: 2022-12-09

## 2023-03-20 RX ADMIN — LAMOTRIGINE 150 MG: 25 TABLET ORAL at 11:42

## 2023-03-20 RX ADMIN — CLONIDINE HYDROCHLORIDE 0.1 MG: 0.1 TABLET ORAL at 21:50

## 2023-03-20 RX ADMIN — CLONAZEPAM 1 MG: 1 TABLET ORAL at 18:13

## 2023-03-20 RX ADMIN — CLONAZEPAM 1 MG: 1 TABLET ORAL at 08:23

## 2023-03-20 ASSESSMENT — ACTIVITIES OF DAILY LIVING (ADL)
ADLS_ACUITY_SCORE: 30

## 2023-03-20 NOTE — DISCHARGE INSTRUCTIONS
Behavioral Discharge Planning and Instructions    Summary:     Main Diagnosis:     Health Care Follow-up:     NUMBERS TO CALL IN CRISIS    National Suicide Prevention Lifeline ([facility])  1-805.936.7950    Crisis Text Line ([place])  Text  HOME  to 381145    Mental Health Crisis Line ([place], [place])  111.324.2617    [facility] Mobile Crisis ([place], [place])   139-617-9893      If you are feeling very unsafe, call 911 or bring yourself to the Emergency Room        Counseling in [place]:  Danilo Lilly           [phone]  [facility]    Jaymie Justice, [facility]      [phone]  [facility]     Nydia Lowe, [facility] (young kids)    [phone]   Cheli Garcia, [facility] (older kids & adults)  [phone]   Dre Quintana, [facility]      [phone]  [facility]       [phone]   Wm Cid MS,    Agata Saunders MA, [facility]   Ya Deleon MS psychotherapist   Kitty Beyer MS, [facility]   Giselle Tillman, [facility], counselor   Abby Cid [facility], counselor  [place]        [phone]  Weston Duvall, counseling  Dr Tanisha Dean, psychiatry  Mitzi Chung, counseling  Jus Dumas, counseling  Justina Shipley, counseling  Belle Madrid, psychiatry   [facility]       [phone]   Nancy Nina MA, LMFT, RPFS   Mendy Steven MSW, [facility]          [phone]  [facility]      [phone]   Abby Smith MS, [facility]          [phone]        Singh Goldstein MSW, [facility] , C-MI   Elizabeth Velásquez MSW, [facility]                                                    Romeo Ramirez [facility]      Moraima Joy MS, [facility]                [phone]      Donya FieldsyD, owner      Sandee MORSE, BECCA Ivory PhD   Irene Roger MSW, LICSW [facility]       [phone]   Linda Meneses [facility]   Robin CERDA,  CNP,     German Bravo MSW, LGSW (adolescents)   Jackie Grinnel APRN, CNP (Hib via telehealth; adolescents)   Marilee STEINER CNP (Hib via telehealth)   Tato Sands MA, LPC, BCIA (Hib via telehealth)   Paola Patel [facility] MSW, LGSW   Betsy Martinezrabee LICSW   Giles Weinstein DNP, APRN, CNP   Nita Antonio RN, BSN   Jillian Hutchins RN-BC, BSN (Hib via telehealth)  [facility]         [phone]   Mady Ferguson, MSN, [facility]           [phone]   Herber Enriquez MA, [facility]   Nikole Pan MS RN [facility] NP   Abby Bullard, [facility]         [phone]  [facility]       [phone]   [facility]   Priscilla Kirkpatrick (to be LPC)   Inocente Goss (to be [facility])      Counseling in [place]:  [facility]       [phone]   mental health & CD counseling  Tato Henderson       [phone]  [facility]       [phone]  [facility]        [phone]   Nancy Diego  [facility]       [facility]              [phone]   can do weekends and evenings   Wali Camarillo, MSW, LICSW, LCSW, CCTP    Emery Langston MA, LMFT, [facility]  [facility]       evenings, weekends  [phone]      Counseling in [place]:  [facility]         [phone]   Mady Ferguson, MSN, [facility]   Temi Mcelroy MA, [facility]  [facility]      [phone]  [facility]       [phone]   Dottie Justice PMFT  [facility]       Tiffany White       [phone]  [place] [id number] [facility]     [facility]ett [facility]      [phone]  [facility]     Jus Segura MS Ed, [facility]    [phone]    (kids) denotes providers who also see kids      Attend all scheduled appointments with your outpatient providers. Call at least 24 hours in advance if you need to reschedule an appointment to ensure continued access to your outpatient  "providers.     Major Treatments, Procedures and Findings:  You were provided with: a psychiatric assessment, assessed for medical stability, medication evaluation and/or management, group therapy, family therapy, individual therapy, CD evaluation/assessment, milieu management, and medical interventions    Symptoms to Report: feeling more aggressive, increased confusion, losing more sleep, mood getting worse, or thoughts of suicide    Early warning signs can include: increased depression or anxiety sleep disturbances increased thoughts or behaviors of suicide or self-harm  increased unusual thinking, such as paranoia or hearing voices    Safety and Wellness:  Take all medicines as directed.  Make no changes unless your doctor suggests them.      Follow treatment recommendations.  Refrain from alcohol and non-prescribed drugs.  Ask your support system to help you reduce your access to items that could harm yourself or others. Items could include:  Firearms  Medicines (both prescribed and over-the-counter)  Knives and other sharp objects  Ropes and like materials  Car keys  If there is a concern for safety, call 911. If there is a concern for safety, call 911.    Resources:   Crisis Intervention: 420.471.6785 or 637-007-9984 (TTY: 754.744.5772).  Call anytime for help.  National [place] on Mental Illness (www.[place].bianca.org): 991.203.8001 or 617-708-3952.  Alcoholics Anonymous (www.alcoholics-anonymous.org): Check your phone book for your local chapter.  Suicide Awareness Voices of Education (SAVE) (www.save.org): 546-772-OYAW (4183)  National Suicide Prevention Line (www.mentalhealthmn.org): 702-460-CWRB (0409)  Mental Health Consumer/Survivor Network of [place] (www.mhcsn.net): 666.436.2288 or 353-022-9076  Mental Health Association of [place] (www.mentalhealth.org): 639.111.8534 or 135-906-1853  Self- Management and Recovery Training., SMART-- Toll free: 588.156.3907  www.[employer] [facility]  Text 4 Life: txt \"LIFE\" to 26651 for " "immediate support and crisis intervention  Crisis text line: Text \"[place]\" to 175252. Free, confidential, 24/7.  Crisis Intervention: 643.631.8289 or 587-125-1583. Call anytime for help.     General Medication Instructions:   See your medication sheet(s) for instructions.   Take all medicines as directed.  Make no changes unless your doctor suggests them.   Go to all your doctor visits.  Be sure to have all your required lab tests. This way, your medicines can be refilled on time.  Do not use any drugs not prescribed by your doctor.  Avoid alcohol.    Advance Directives:   Scanned document on file with [facility]? No scanned doc  Is document scanned? Pt states no documents  Honoring Choices Your Rights Handout: Informed and given  Was more information offered? Pt declined    The Treatment team has appreciated the opportunity to work with you. If you have any questions or concerns about your recent admission, you can contact the unit which can receive your call 24 hours a day, 7 days a week. They will be able to get in touch with a Provider if needed. The unit number is [phone] .  "

## 2023-03-20 NOTE — PLAN OF CARE
Problem: Adult Behavioral Health Plan of Care  Goal: Patient-Specific Goal (Individualization)  Description: Patient will sleep 6 to 8 hours per night.   Patient will eat at least 50% of meals Patient will attend at least 50% of groups.   PT will work with treatment team.   Patient will take recommended medications.   Patient will be free from self harm or injury   3/20/2023 0420 by Tiffany Huber RN  Outcome: Progressing  Note: Report received from Kinjal. Rounding complete. Pt observed sleeping in left side lying position with regular and unlabored respirations.    Pt has been in bed with eyes closed and regular respirations. 15 minute and PRN checks all night. No complaints offered. Will continue to monitor.    Pt slept approx  5.5  hours this NOC shift.    Face to face end of shift report communicated to oncoming RN.    Tiffany LAZARO RN  March 20, 2023  4:22 AM          Problem: Suicide Risk  Goal: Absence of Self-Harm  3/20/2023 0420 by Tiffany Huber RN  Outcome: Progressing  Note: Unable to assess due to pt sleeping. Pt has remained free of self-harm.

## 2023-03-20 NOTE — PROGRESS NOTES
03/19/23 2302   Patient Belongings   Did you bring any home meds/supplements to the hospital?  No   Patient Belongings locker;sent to security per site process   Patient Belongings Put in Hospital Secure Location (Security or Locker, etc.) other (see comments)  (Boots, Pants, Socks, Shirt, Sweatshirt, Underwear)   Belongings Search Yes   Clothing Search Yes   Second Staff Julianna AGUILERA     List items sent to safe: Chain, [employer] Card, [employer] Card, [place] Drivers License, Permit Carry Card, Debit Card x2, Misc Cards x4, Wallet  All other belongings put in assigned cubby in belongings room.     I have reviewed my belongings list on admission and verify that it is correct.     Patient signature_______________________________    Second staff witness (if patient unable to sign) ______________________________       I have received all my belongings at discharge.    Patient signature________________________________    LALITA  3/19/2023  11:03 PM

## 2023-03-20 NOTE — PROGRESS NOTES
[facility]    Spiritual Health Progress Note    Date of Service (when I saw the patient): 03/20/2023     Assessment & Plan   Osvaldo Hadley is a 22 year old male who was admitted on 3/19/2023.  Introduced the patient as an initial visit to Spiritual Health Services. Patient was open to the visit and had our discussion in the consult room.    Patient was very sad and tearful at first. We discussed some of his reasons for being on the floor. We talked through patient's upbringing. It was very difficult as he had no real memories of his father except one and his mother was an alcoholic. Patient had to work at a very young age and has very little idea of an intact family. Isolation and social anxiety seem to be strong issues leading up to his current breakdown.      discussed his value as a person, that there is hope in his journey, and encouraged him that he is in a good place for help. Patient has an interest in spiritual things, he just has not had much experience and would like to explore more. Discussed some coping skills with anxiety and panic. Patient said our time was helpful and encouraging.  encouraged patient to ask for  if he had further questions or would like to talk some more.    Rev. Nolan Blue  Volunteer
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
## 2023-03-20 NOTE — CARE PLAN
Prior to Admission Medication Reconciliation:     Medications added:   [] None  [x] As listed below:    CBD pt uses PRN    Medications deleted:   [] None  [x] As listed below:    Clonidine- no longer active, but pt wishes to have it ordered while here    Medications marked for review/removal by attending:  [x] None  [] As listed below:    Changes made to existing medications:   [x] None  [x] Updated time of day, strengths and frequencies to most current.       Pertinent notes/medications patient takes different than prescribed:     Atarax- pt reports usually using BID     Last times/dates taken verified with patient:  [x] Yes- completed myself   [] Nurse completed, no changes made (double checked entries)  [] Unable to review with patient at this time:    Allergy review:    []Did not review: reviewed by nursing  [x]Allergies reviewed and updated if needed.     Medication reconciliation sources:   [x]Patient  []Patient family member/emergency contact: **  [x][facility] Report Review  [x]Epic Chart Review  [x]Care Everywhere review  []Pharmacy med list/phone call: **  []Fill dates reflect compliancy. No concerns.  []Fill dates do not reflect compliancy on the following medications:   [x]Outside meds dispense report:   [x]Fill dates reflect compliancy. No concerns.  []Fill dates do not reflect compliancy on the following medications:   []HomeCare medlist, Nursing home or Assisted Living MAR:  []Behavioral Health Provider:      []Other: **    Pharmacy desired at discharge: [facility]    Is patient on coumadin?   [x]No  []Yes      Fill dates and reported compliancy:  [x] Compliant: fill dates reflect reported compliancy.   [] Not applicable. Patient is not taking any prescribed maintenance medications at this time.   [] Fill dates do not coincide with compliancy, but reports compliancy.    [] Fill dates reflect compliancy, but reports non-compliancy.   [] Cannot assess at this time.     Historian accuracy:  [x]  Excellent- alert and oriented, understands why meds were prescribed and how to take, able to answer specifics  [] Good- alert and oriented, understands why meds were prescribed and how to take, some confusion   [] Fair- alert and oriented, doesn't know medications without list, cannot answer specifics about medications, but has a decent process for which to take at home  [] Poor- does not know medications, may not have a process to take at home, may be cognitively unable to review at this time  []Medication management done by family member or facility, no concerns about historian accuracy.   [] Cannot assess at this time.     Medication Management:  [x] Manages meds independently  [] Family member/ other party manages meds/assists:  [] Meds managed by staff at facility  [] Meds set up by home care, family/other party helps administer  [] Meds set up by home care, self administers  [] Cannot assess at this time.     Other medications aside from PTA:  [x] Denies taking any other medications aside from those listed in PTA meds, this includes over-the-counter vitamins, supplements and analgesics.   [] Unable to confirm at this time.     Sonal Mir on 3/20/2023 at 11:51 AM     Notifying appropriate party of changes/additions/discrepancies:  [x]No pertinent changes made, notification not necessary.   [] Notified attending provider via text page/phone call/sticky note or other:  [] Notified other:  [] Medications have not been reconciled by a provider yet, notification not necessary  [] Pt is not admitted to floor yet or patient is boarding, PTA meds completed before admission.   Medications Prior to Admission   Medication Sig Dispense Refill Last Dose     clonazePAM (KLONOPIN) 1 MG tablet Take 1 tablet by mouth 2 times daily   Past Week     HEMP OIL OR EXTRACT OR OTHER CBD CANNABINOID, NOT MEDICAL CANNABIS, 1 Dose as needed :Gummies/ oil/ topicals   Past Week     hydrOXYzine (VISTARIL) 25 MG capsule Take 25-50 mg by  mouth 4 times daily as needed   Past Week     lamoTRIgine (LAMICTAL) 150 MG tablet Take 1 tablet by mouth daily   Past Week     PROAIR  (90 Base) MCG/ACT inhaler Inhale 2 puffs into the lungs every 6 hours as needed   Past Month     VYVANSE 30 MG capsule Take 30 mg by mouth every morning   Past Week

## 2023-03-20 NOTE — ED NOTES
Patient on placed on 72 hour hold for emergency admission s/t SI.  Patient was read and given copy Patient rights
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
## 2023-03-20 NOTE — PLAN OF CARE
"Problem: Adult Behavioral Health Plan of Care  Goal: Patient-Specific Goal (Individualization)  Description: Patient will sleep 6 to 8 hours per night.   Patient will eat at least 50% of meals Patient will attend at least 50% of groups.   PT will work with treatment team.   Patient will take recommended medications.   Patient will be free from self harm or injury   Outcome: Progressing     Problem: Suicide Risk  Goal: Absence of Self-Harm  Outcome: Progressing     Face to face shift report received from Tiffany. Aishaing completed, pt observed in the lounge talking with peers.    Patient ate breakfast in the lounge and ate 100%. He took morning medication as ordered. He states he slept ok last night. Denies SI/HI, hallucinations & pain. He rates his anxiety at 4/10 and feels this is an ok level for him.     1150 - Patient took noon medication as ordered. Patient allowed writer to finish his admission flow sheets. He did say \" I throw a tantrum\" when asked about what he does when he gets angry or frustrated. He also stated that the best way to help him if he gets angry or frustrated is to let him to go his room and be by himself. Patient was pleasant with interaction, he did not get worked up when asked about answering questions. The only question he had for writer was how things worked when it was time for him to leave. Informed him that we make sure he has a place to go, a ride, we help with setting up needed appointments and getting medications on discharge as well, patient accepted this well and seemed at ease.    Patient's mid day vitals as follows; Blood Pressure 153/84  Temp 98.6 Pulse 78 Respirations 16 SpO2 99% RA    Patient ate lunch in the lounge and ate about 50%.     He attended one group after lunch and then was observed in the lounge talking at the tables with peers.    Face to face report will be communicated to oncoming RN.    Kinjal Madrid RN  3/20/2023  2:46 PM    "
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
## 2023-03-20 NOTE — PLAN OF CARE
"ADMISSION NOTE    Reason for admission suicidal threats.  Safety concerns no.  Risk for or history of violence no.   Full skin assessment: yes-WDL    Patient arrived on unit from [facility] ED accompanied by security officers on 3/19/2023  2252 PM.   Status on arrival: A&O, denies pain. Cooperative with contraband check and changing into scrubs.   States he is not suicidal-had break up with girlfriend and was impulsive. States he said things he did not mean and took as much of his belongings out and put them in his car. Pt states he wasted a lot of food he had just purchased. \"I'm not proud of how I've acted.\"  Pt states he was angry about hospitalization at first but thinks it is good for him to \"take some time away.\"   Pt does endorse social anxiety and is worried   Complete admission deferred to oncoming shift due to time constraint.     /85   Pulse 75   Temp 98.2  F (36.8  C) (Tympanic)   Resp 16   Ht 1.829 m (6')   Wt 80.2 kg (176 lb 14.4 oz)   SpO2 99%   BMI 23.99 kg/m    Patient given tour of unit and Welcome to  unit papers given to patient, wanding completed, belongings inventoried, and admission assessment completed.   Patient's legal status on arrival is 72 hour hold. Appropriate legal rights discussed with and copy given to patient. Patient Bill of Rights discussed with and copy given to patient.   Patient denies SI, HI, and thoughts of self harm and contracts for safety while on unit.      Kinjal Kidd RN  3/19/2023  11:17 PM    Problem: Adult Behavioral Health Plan of Care  Goal: Patient-Specific Goal (Individualization)  Description: Patient will sleep 6 to 8 hours per night.   Patient will eat at least 50% of meals Patient will attend at least 50% of groups.   PT will work with treatment team.   Patient will take recommended medications.   Patient will be free from self harm or injury   Outcome: Progressing     Problem: Suicide Risk  Goal: Absence of Self-Harm  Outcome: " Progressing   Goal Outcome Evaluation:       Face to face end of shift report communicated to oncoming shift.     Kinjal Kidd RN  3/19/2023  11:24 PM

## 2023-03-20 NOTE — ED NOTES
Face to face report given with opportunity to observe patient.    Report given to ANALY Jacobo.    Kurt Kennedy RN   3/19/2023  7:13 PM
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
## 2023-03-20 NOTE — ED NOTES
Patient alert oriented and cooperative.  Patient denies SI at this time and is awaiting DEC assessment.

## 2023-03-21 PROCEDURE — 124N000001 HC R&B MH

## 2023-03-21 PROCEDURE — 250N000013 HC RX MED GY IP 250 OP 250 PS 637: Performed by: NURSE PRACTITIONER

## 2023-03-21 PROCEDURE — 99232 SBSQ HOSP IP/OBS MODERATE 35: CPT | Performed by: STUDENT IN AN ORGANIZED HEALTH CARE EDUCATION/TRAINING PROGRAM

## 2023-03-21 RX ORDER — TRAZODONE HYDROCHLORIDE 50 MG/1
50 TABLET, FILM COATED ORAL
Status: DISCONTINUED | OUTPATIENT
Start: 2023-03-21 | End: 2023-03-22 | Stop reason: HOSPADM

## 2023-03-21 RX ORDER — LAMOTRIGINE 100 MG/1
200 TABLET ORAL DAILY
Status: DISCONTINUED | OUTPATIENT
Start: 2023-03-22 | End: 2023-03-22 | Stop reason: HOSPADM

## 2023-03-21 RX ADMIN — CLONAZEPAM 1 MG: 1 TABLET ORAL at 18:00

## 2023-03-21 RX ADMIN — Medication 3 MG: at 23:47

## 2023-03-21 RX ADMIN — LAMOTRIGINE 150 MG: 25 TABLET ORAL at 08:16

## 2023-03-21 RX ADMIN — CLONAZEPAM 1 MG: 1 TABLET ORAL at 10:21

## 2023-03-21 RX ADMIN — CLONIDINE HYDROCHLORIDE 0.1 MG: 0.1 TABLET ORAL at 20:44

## 2023-03-21 ASSESSMENT — ACTIVITIES OF DAILY LIVING (ADL)
ADLS_ACUITY_SCORE: 30

## 2023-03-21 NOTE — PLAN OF CARE
Face to face report received from Iram BECKFORD. Pt. Observed.     Problem: Adult Behavioral Health Plan of Care  Goal: Patient-Specific Goal (Individualization)  Description: Patient will sleep 6 to 8 hours per night.  Patient will eat at least 50% of meals.  Patient will attend at least 50% of groups.   PT will be open to treatment team recommendations for med changes and aftercare.     Outcome: Progressing  Pt has been in bed with eyes closed and regular respirations x 5 hours this noc shift. 15 minute and PRN checks all night. Will continue to monitor.    Pt. To nurses station @ approximately 0430 requesting to have PRN clonidine dose increased. Provider sticky noted.      Face to face end of shift report communicated to oncoming RN.     Ana Cisneros RN  3/21/2023  3:56 AM

## 2023-03-21 NOTE — PLAN OF CARE
Problem: Adult Behavioral Health Plan of Care  Goal: Patient-Specific Goal (Individualization)  Description: Patient will sleep 6 to 8 hours per night.  Patient will eat at least 50% of meals.  Patient will attend at least 50% of groups.   PT will be open to treatment team recommendations for med changes and aftercare.   Outcome: Progressing     Problem: Suicide Risk  Goal: Absence of Self-Harm  Description: Patient will be free from self harm or injury while inpatient.   Patient will be able to report 2 new coping skills learned while inpatient.   Outcome: Progressing     Face to face shift report received from Sherie. Rounding completed, pt observed in the lounge looking out the window.    Patient ate his breakfast in his room and ate 100%. He took his morning medication as ordered. He felt his anxiety was good compared to yesterday. He denies SI/HI, hallucinations & pain.     He did not go to morning group.     1021 - Patient requested something for anxiety. PRN klonopin 1mg PO provided.     1147 - Patient to the nurse desk asking to speak with someone about medications. Pt was informed by SW that Dr will be speaking with him today.    Patient ate lunch in the lounge and ate 100%. He then went to group after lunch and was on the exercise bike. After group he sat in the lounge coloring for a bit.    1400- Patient came to the window asking writer if there was anything that we could give him at night to help him suppress his dreams as he said he wakes up drenched in sweat.  Provider to see him yet today.     Face to face report will be communicated to oncoming RN.    Kinjal Madrid RN  3/21/2023  2:58 PM

## 2023-03-21 NOTE — PROGRESS NOTES
Spoke with pt this afternoon. Pt is more insightful into his CD and mental health. He shares that he would like to go to treatment. He states he has a friend in [place] that he would like to go see and go to treatment there though does understand his insurance might not cover it and is willing to do treatment in [place].     Pt has no insurance listed, contacted Estelle and left message.
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
## 2023-03-21 NOTE — PLAN OF CARE
Problem: Adult Behavioral Health Plan of Care  Goal: Patient-Specific Goal (Individualization)  Description: Patient will sleep 6 to 8 hours per night.  Patient will eat at least 50% of meals.  Patient will attend at least 50% of groups.   PT will be open to treatment team recommendations for med changes and aftercare.     Outcome: Progressing     Problem: Suicide Risk  Goal: Absence of Self-Harm  Description: Patient will be free from self harm or injury while inpatient.   Patient will be able to report 2 new coping skills learned while inpatient.   Outcome: Progressing   Goal Outcome Evaluation:       Pt. Has been up and about on unit, eating at least 50% of meals, attending at least 50% of group therapy sessions, cooperative with treatment team recommendations, taking prescribed medications, has been free from harm/injury, denies suicidal ideation and hallucinations, is able to make needs be known, polite and cooperative with cares, will continue to monitor progress.  1800-  Pt. Requested/received klonopin 1 mg po for anxiety.  2044-  Pt. Requested/received clonidine 0.1 mg po for anxiety.      Face to face end of shift report will be communicated to oncoming night shift RN.     Tiffany Donald RN  3/21/2023  5:55 PM
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
## 2023-03-22 VITALS
WEIGHT: 176.9 LBS | RESPIRATION RATE: 16 BRPM | SYSTOLIC BLOOD PRESSURE: 141 MMHG | DIASTOLIC BLOOD PRESSURE: 92 MMHG | HEART RATE: 69 BPM | TEMPERATURE: 98.6 F | HEIGHT: 72 IN | BODY MASS INDEX: 23.96 KG/M2 | OXYGEN SATURATION: 99 %

## 2023-03-22 PROCEDURE — 99239 HOSP IP/OBS DSCHRG MGMT >30: CPT | Performed by: STUDENT IN AN ORGANIZED HEALTH CARE EDUCATION/TRAINING PROGRAM

## 2023-03-22 PROCEDURE — 250N000013 HC RX MED GY IP 250 OP 250 PS 637: Performed by: NURSE PRACTITIONER

## 2023-03-22 PROCEDURE — 250N000013 HC RX MED GY IP 250 OP 250 PS 637: Performed by: STUDENT IN AN ORGANIZED HEALTH CARE EDUCATION/TRAINING PROGRAM

## 2023-03-22 RX ORDER — LAMOTRIGINE 200 MG/1
200 TABLET ORAL DAILY
Qty: 30 TABLET | Refills: 1 | Status: SHIPPED | OUTPATIENT
Start: 2023-03-23

## 2023-03-22 RX ORDER — LISDEXAMFETAMINE DIMESYLATE 30 MG/1
30 CAPSULE ORAL EVERY MORNING
Qty: 30 CAPSULE | Refills: 0 | Status: SHIPPED | OUTPATIENT
Start: 2023-03-22

## 2023-03-22 RX ORDER — CLONAZEPAM 1 MG/1
1 TABLET ORAL 2 TIMES DAILY PRN
Status: SHIPPED | DISCHARGE
Start: 2023-03-22

## 2023-03-22 RX ADMIN — LAMOTRIGINE 200 MG: 100 TABLET ORAL at 08:04

## 2023-03-22 RX ADMIN — CLONAZEPAM 1 MG: 1 TABLET ORAL at 06:22

## 2023-03-22 ASSESSMENT — ACTIVITIES OF DAILY LIVING (ADL)
ADLS_ACUITY_SCORE: 30
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
## 2023-03-22 NOTE — PLAN OF CARE
"  Problem: Adult Behavioral Health Plan of Care  Goal: Patient-Specific Goal (Individualization)  Description: Patient will sleep 6 to 8 hours per night.  Patient will eat at least 50% of meals.  Patient will attend at least 50% of groups.   PT will be open to treatment team recommendations for med changes and aftercare.     Outcome: Progressing  Note: Patient up on unit, patient is walking back and forth, \"I have anxiety, can I have some hydroxyzine?\"  Talked with patient regarding this. Informed patient that clonidine is available for anxiety and hydroxyzine is the second choice.  \"well I did take the Klonopin earlier, it's just that I'm not at that point yet, the staff got me really upset for no reason and now it's getting better\"      Patient takes medication as prescribed.    Patient reports that \"clonidine only makes me tired, I like to take that for sleep, trazodone doesn't work\"    Patient is tearful at times, lets needs be known, spends time in between group, lounge and room.    Patient denies SI, HI, AH/VH and pain.      Patient thanks staff.  \"the staff here saved my life\"      Goal Outcome Evaluation:                        "

## 2023-03-22 NOTE — PROGRESS NOTES
Pt is discharging at the recommendation of the treatment team. Pt is discharging to home transported by mom. Pt denies having any thoughts of hurting themself or anyone else. Pt denies anxiety or depression. Pt has resources listed. Discharge instructions, including; demographic sheet, psychiatric evaluation, discharge summary, and AVS were faxed to these next level of care providers.

## 2023-03-22 NOTE — PLAN OF CARE
"Face to face report received from Tiffany BECKFORD. Pt. Observed.     Problem: Adult Behavioral Health Plan of Care  Goal: Patient-Specific Goal (Individualization)  Description: Patient will sleep 6 to 8 hours per night.  Patient will eat at least 50% of meals.  Patient will attend at least 50% of groups.   PT will be open to treatment team recommendations for med changes and aftercare.     Outcome: Progressing  Pt has been in bed with eyes closed and regular respirations x 4 hours this noc shift. 15 minute and PRN checks all night. Will continue to monitor.    2347 Pt. Administered PRN Melatonin 3 mg po per his request for sleep. Not effective. Pt. To [facility] station @ 330 reporting he needs a PRN for sleep. Pt. Declining Trazodone. \"Elsie tried it before and it doesn't help.\"     Pt. To nurses station requesting scheduled a.m. medications. Pt. Updated on scheduled time of medication. Pt. Requesting/administered PRN Klonopin 1 mg po @ 0622.      Face to face end of shift report communicated to oncoming RN.     Ana Cisneros RN  3/22/2023       "

## 2023-03-22 NOTE — PLAN OF CARE
Discharge Note    Patient Discharged to home on 3/22/2023 1:58 PM via Private Car accompanied by staff.     Patient informed of discharge instructions in AVS. patient verbalizes understanding and denies having any questions pertaining to AVS. Patient stable at time of discharge. Patient denies SI, HI, and thoughts of self harm at time of discharge. All personal belongings returned to patient. Discharge prescriptions sent to [facility]. \Hospital Has a New Name and Outlook.\"" via electronic communication. Psych evaluation, history and physical, AVS, and discharge summary faxed to next level of care.     Yamel Clayton RN  3/22/2023  2:02 PM

## 2023-03-23 ENCOUNTER — TELEPHONE (OUTPATIENT)
Dept: BEHAVIORAL HEALTH | Facility: HOSPITAL | Age: 23
End: 2023-03-23

## 2023-03-23 NOTE — TELEPHONE ENCOUNTER
He was discharged to home on  3/22/23 from [facility]. I called today regarding the patient's discharge. No answer was received. Unable to leave a message.

## 2024-02-17 ENCOUNTER — HEALTH MAINTENANCE LETTER (OUTPATIENT)
Age: 24
End: 2024-02-17

## 2025-03-09 ENCOUNTER — HEALTH MAINTENANCE LETTER (OUTPATIENT)
Age: 25
End: 2025-03-09